# Patient Record
Sex: FEMALE | Race: BLACK OR AFRICAN AMERICAN | Employment: FULL TIME | URBAN - METROPOLITAN AREA
[De-identification: names, ages, dates, MRNs, and addresses within clinical notes are randomized per-mention and may not be internally consistent; named-entity substitution may affect disease eponyms.]

---

## 2018-07-07 ENCOUNTER — HOSPITAL ENCOUNTER (EMERGENCY)
Age: 28
Discharge: HOME | End: 2018-07-07
Attending: EMERGENCY MEDICINE
Payer: SUBSIDIZED

## 2018-07-07 VITALS
OXYGEN SATURATION: 98 % | DIASTOLIC BLOOD PRESSURE: 98 MMHG | RESPIRATION RATE: 17 BRPM | TEMPERATURE: 97.8 F | HEART RATE: 103 BPM | SYSTOLIC BLOOD PRESSURE: 151 MMHG

## 2018-07-07 DIAGNOSIS — R03.0 ELEVATED BLOOD PRESSURE READING: ICD-10-CM

## 2018-07-07 DIAGNOSIS — Z20.2 EXPOSURE TO CHLAMYDIA: ICD-10-CM

## 2018-07-07 DIAGNOSIS — A59.9 TRICHOMONAL INFECTION: Primary | ICD-10-CM

## 2018-07-07 LAB
APPEARANCE UR: ABNORMAL
BACTERIA URNS QL MICRO: ABNORMAL /HPF
BILIRUB UR QL: NEGATIVE
COLOR UR: YELLOW
EPITH CASTS URNS QL MICRO: ABNORMAL /LPF (ref 0–5)
GLUCOSE UR STRIP.AUTO-MCNC: NEGATIVE MG/DL
HCG UR QL: NEGATIVE
HGB UR QL STRIP: ABNORMAL
HYALINE CASTS URNS QL MICRO: ABNORMAL /LPF (ref 0–2)
KETONES UR QL STRIP.AUTO: NEGATIVE MG/DL
LEUKOCYTE ESTERASE UR QL STRIP.AUTO: ABNORMAL
MUCOUS THREADS URNS QL MICRO: ABNORMAL /LPF
NITRITE UR QL STRIP.AUTO: NEGATIVE
PH UR STRIP: 5.5 [PH] (ref 5–8)
PROT UR STRIP-MCNC: 30 MG/DL
RBC #/AREA URNS HPF: ABNORMAL /HPF (ref 0–5)
SERVICE CMNT-IMP: NORMAL
SP GR UR REFRACTOMETRY: 1.02 (ref 1–1.03)
UROBILINOGEN UR QL STRIP.AUTO: 1 EU/DL (ref 0.2–1)
WBC URNS QL MICRO: ABNORMAL /HPF (ref 0–5)
WET PREP GENITAL: NORMAL

## 2018-07-07 PROCEDURE — 96372 THER/PROPH/DIAG INJ SC/IM: CPT

## 2018-07-07 PROCEDURE — 74011250637 HC RX REV CODE- 250/637: Performed by: PHYSICIAN ASSISTANT

## 2018-07-07 PROCEDURE — 81001 URINALYSIS AUTO W/SCOPE: CPT | Performed by: PHYSICIAN ASSISTANT

## 2018-07-07 PROCEDURE — 87491 CHLMYD TRACH DNA AMP PROBE: CPT | Performed by: PHYSICIAN ASSISTANT

## 2018-07-07 PROCEDURE — 99284 EMERGENCY DEPT VISIT MOD MDM: CPT

## 2018-07-07 PROCEDURE — 81025 URINE PREGNANCY TEST: CPT | Performed by: PHYSICIAN ASSISTANT

## 2018-07-07 PROCEDURE — 74011250636 HC RX REV CODE- 250/636: Performed by: PHYSICIAN ASSISTANT

## 2018-07-07 PROCEDURE — 87210 SMEAR WET MOUNT SALINE/INK: CPT | Performed by: PHYSICIAN ASSISTANT

## 2018-07-07 RX ORDER — AZITHROMYCIN 1 G/1
1 POWDER, FOR SUSPENSION ORAL
Status: DISCONTINUED | OUTPATIENT
Start: 2018-07-07 | End: 2018-07-07

## 2018-07-07 RX ORDER — AZITHROMYCIN 250 MG/1
1000 TABLET, FILM COATED ORAL
Status: COMPLETED | OUTPATIENT
Start: 2018-07-07 | End: 2018-07-07

## 2018-07-07 RX ORDER — CEFTRIAXONE 250 MG/8ML
250 INJECTION, POWDER, FOR SOLUTION INTRAMUSCULAR; INTRAVENOUS
Status: COMPLETED | OUTPATIENT
Start: 2018-07-07 | End: 2018-07-07

## 2018-07-07 RX ADMIN — CEFTRIAXONE SODIUM 250 MG: 250 INJECTION, POWDER, FOR SOLUTION INTRAMUSCULAR; INTRAVENOUS at 11:55

## 2018-07-07 RX ADMIN — METRONIDAZOLE 2000 MG: 250 TABLET ORAL at 11:54

## 2018-07-07 RX ADMIN — AZITHROMYCIN 1000 MG: 250 TABLET, FILM COATED ORAL at 11:55

## 2018-07-07 NOTE — LETTER
7/10/2018 7:16 AM 
 
Ms. Ermelinda Guadalupe [address] [address] We have been unable to reach you by phone to notify you of your test results. Please call emergency room at [phone] and ask to speak with one of our providers in order to explain these results to you and advise you of any recommendations. Sincerely, Arlin Estes PA-C

## 2018-07-07 NOTE — DISCHARGE INSTRUCTIONS
Exposure to Sexually Transmitted Infections: Care Instructions  Your Care Instructions    Sexually transmitted infections (STIs) are those diseases spread by sexual contact. There are at least 20 different STIs, including chlamydia, gonorrhea, syphilis, and human immunodeficiency virus (HIV), which causes AIDS. Bacteria-caused STIs can be treated and cured. STIs caused by viruses, such as HIV, can be treated but not cured. Some STIs can reduce a woman's chances of getting pregnant in the future. STIs are spread during sexual contact, such as vaginal intercourse and oral or anal sex. Follow-up care is a key part of your treatment and safety. Be sure to make and go to all appointments, and call your doctor if you are having problems. It's also a good idea to know your test results and keep a list of the medicines you take. How can you care for yourself at home? · Your doctor may have given you a shot of antibiotics. If your doctor prescribed antibiotic pills, take them as directed. Do not stop taking them just because you feel better. You need to take the full course of antibiotics. · Do not have sexual contact while you have symptoms of an STI or are being treated for an STI. · Tell your sex partner (or partners) that he or she will need treatment. · If you are a woman, do not douche. Douching changes the normal balance of bacteria in the vagina and may spread an infection up into your reproductive organs. To prevent exposure to STIs in the future  · Use latex condoms every time you have sex. Use them from the beginning to the end of sexual contact. · Talk to your partner before you have sex. Find out if he or she has or is at risk for any STI. Keep in mind that a person may be able to spread an STI even if he or she does not have symptoms. · Do not have sex if you are being treated for an STI. · Do not have sex with anyone who has symptoms of an STI, such as sores on the genitals or mouth.   · Having one sex partner (who does not have STIs and does not have sex with anyone else) is a good way to avoid STIs. When should you call for help? Call your doctor now or seek immediate medical care if:  ? · You have new pain in your belly or pelvis. ? · You have symptoms of a urinary tract infection. These may include:  ¨ Pain or burning when you urinate. ¨ A frequent need to urinate without being able to pass much urine. ¨ Pain in the flank, which is just below the rib cage and above the waist on either side of the back. ¨ Blood in your urine. ¨ A fever. ? · You have new or worsening pain or swelling in the scrotum. ? Watch closely for changes in your health, and be sure to contact your doctor if:  ? · You have unusual vaginal bleeding. ? · You have a discharge from the vagina or penis. ? · You have any new symptoms, such as sores, bumps, rashes, blisters, or warts. ? · You have itching, tingling, pain, or burning in the genital or anal area. ? · You think you may have an STI. Where can you learn more? Go to http://leona-liss.info/. Enter M663 in the search box to learn more about \"Exposure to Sexually Transmitted Infections: Care Instructions. \"  Current as of: March 20, 2017  Content Version: 11.4  © 9037-1264 [employer]. Care instructions adapted under license by [employer] (which disclaims liability or warranty for this information). If you have questions about a medical condition or this instruction, always ask your healthcare professional. [place] [address] any warranty or liability for your use of this information. Trichomoniasis: Care Instructions  Your Care Instructions  Trichomoniasis is a sexually transmitted infection (STI) that is spread by having sex with an infected partner. Trichomoniasis is commonly called trich (say \"trick\"). In women, trich may cause vaginal itching and a smelly discharge.  But in many cases, especially in men, there are no symptoms. Mariela Emerson is treated so that you do not spread it to others. Both you and your sex partner or partners should be treated at the same time so you do not infect each other again. Trich may cause problems with pregnancy. Your doctor will talk with you about treatment for Trich if you are pregnant. Follow-up care is a key part of your treatment and safety. Be sure to make and go to all appointments, and call your doctor if you are having problems. It's also a good idea to know your test results and keep a list of the medicines you take. How can you care for yourself at home? · Take your antibiotics as directed. Do not stop taking them just because you feel better. You need to take the full course of antibiotics. · Do not have sex while you are being treated. If your doctor gave you a single dose of antibiotics, do not have sex for one week after being treated and until your partner also has been treated. · Tell your sex partner (or partners) that he or she will also need to be tested and treated. · Use a cold water compress or cool baths to relieve itching. To prevent trichomoniasis in the future  · Use latex condoms every time you have sex. Use them from the beginning to the end of sexual contact. · Talk to your partner before having sex. Find out if he or she has or is at risk for trich or any other STI. Keep in mind that a person may be able to spread an STI even if he or she does not have symptoms. · Do not have sex if you are being treated for trich or any other STI. · Do not have sex with anyone who has symptoms of an STI, such as sores on the genitals or mouth. · Having one sex partner (who does not have STIs and does not have sex with anyone else) is a good way to avoid STIs. When should you call for help? Call your doctor now or seek immediate medical care if:  ? · You have unusual vaginal bleeding. ? · You have a fever.    ? · You have new discharge from the vagina or penis. ? · You have pelvic pain. ? Watch closely for changes in your health, and be sure to contact your doctor if:  ? · You do not get better as expected. ? · You have any new symptoms or your symptoms get worse. Where can you learn more? Go to http://leona-liss.info/. Enter A290 in the search box to learn more about \"Trichomoniasis: Care Instructions. \"  Current as of: March 20, 2017  Content Version: 11.4  © 0395-2549 [employer]. Care instructions adapted under license by [employer] (which disclaims liability or warranty for this information). If you have questions about a medical condition or this instruction, always ask your healthcare professional. [address] any warranty or liability for your use of this information.

## 2018-07-07 NOTE — ED PROVIDER NOTES
EMERGENCY DEPARTMENT HISTORY AND PHYSICAL EXAM 
 
Date: 7/7/2018 Patient Name: Megha Herndon History of Presenting Illness Chief Complaint Patient presents with  Vaginal Discharge  
  std exposure History Provided By: Patient Chief Complaint: possible STI Duration: 1 Months Timing:  N/A Location: N/A Quality: N/A Severity: N/A Modifying Factors: unprotected sex Associated Symptoms: denies any other associated signs or symptoms Additional History (Context): Megha Herndon is a 33 y.o. female with recent UTI who presents with concern for having an STI x 1 day. Pt states she got a phone call from a partner she had been seeing who stated he was positive for Chlamydia. Pt states she is concerned and came in to be checked out. PT denied fever, chills, nausea, vomiting, diarrhea, constipation, abdominal pain, vaginal discharge. No other complaints. PCP: Not On File [facility] 
 
Current Facility-Administered Medications Medication Dose Route Frequency Provider Last Rate Last Dose  cefTRIAXone (ROCEPHIN) injection 250 mg  250 mg IntraMUSCular DYLON Cardona PA-C      
 azithromycin [facility]) powder 1 g  1 g Oral DYLON Cardona PA-C      
 metroNIDAZOLE (FLAGYL) 50 mg/mL oral suspension compound 2,000 mg  2,000 mg Oral DYLON Cardona PA-C Current Outpatient Prescriptions Medication Sig Dispense Refill  clotrimazole (LOTRIMIN) 1 % topical cream Apply  to affected area two (2) times a day. Apply to affected area 1 Tube 0  
 hydrOXYzine (ATARAX) 25 mg tablet Take 1-2 tablets by mouth every 6 hours as needed. 30 tablet 0 Past History Past Medical History: 
Past Medical History:  
Diagnosis Date  Hypertension  UTI (lower urinary tract infection) Past Surgical History: No past surgical history on file. Family History: No family history on file. Social History: 
Social History Substance Use Topics  Smoking status: Former Smoker  Smokeless tobacco: Not on file  Alcohol use Yes Comment: 2 wine coollers a week Allergies: 
No Known Allergies Review of Systems Review of Systems Constitutional: Negative for chills and fever. HENT: Negative for congestion and sore throat. Respiratory: Negative for cough, shortness of breath and wheezing. Cardiovascular: Negative for chest pain and palpitations. Gastrointestinal: Negative for abdominal pain, constipation, diarrhea, nausea and vomiting. Genitourinary: Negative for dysuria and urgency. Musculoskeletal: Negative for back pain. Skin: Negative for rash. Neurological: Negative for weakness. All other systems reviewed and are negative. All Other Systems Negative Physical Exam  
 
Vitals:  
 07/07/18 0944 07/07/18 1034 BP: (!) 149/103 (!) 151/98 Pulse: (!) 110 (!) 103 Resp: 17 Temp: 97.8 °F (36.6 °C) SpO2: 98% Physical Exam  
Constitutional: She is oriented to person, place, and time. She appears well-developed and well-nourished. No distress. HENT:  
Head: Normocephalic and atraumatic. Eyes: Conjunctivae are normal. Right eye exhibits no discharge. Left eye exhibits no discharge. Neck: Normal range of motion. Neck supple. Cardiovascular: Normal rate, regular rhythm and intact distal pulses. Exam reveals no gallop and no friction rub. No murmur heard. Pulmonary/Chest: Effort normal and breath sounds normal. No respiratory distress. She has no wheezes. She has no rales. Abdominal: Soft. Bowel sounds are normal. There is no tenderness. There is no rebound and no guarding. Neurological: She is alert and oriented to person, place, and time. Skin: Skin is warm and dry. No rash noted. She is not diaphoretic. Diagnostic Study Results Labs - Recent Results (from the past 12 hour(s)) URINALYSIS W/ RFLX MICROSCOPIC Collection Time: 07/07/18  9:46 AM  
Result Value Ref Range Color YELLOW Appearance CLOUDY Specific gravity 1.024 1.005 - 1.030    
 pH (UA) 5.5 5.0 - 8.0 Protein 30 (A) NEG mg/dL Glucose NEGATIVE  NEG mg/dL Ketone NEGATIVE  NEG mg/dL Bilirubin NEGATIVE  NEG Blood SMALL (A) NEG Urobilinogen 1.0 0.2 - 1.0 EU/dL Nitrites NEGATIVE  NEG Leukocyte Esterase SMALL (A) NEG    
HCG URINE, QL Collection Time: 07/07/18  9:46 AM  
Result Value Ref Range HCG urine, QL NEGATIVE  NEG    
URINE MICROSCOPIC ONLY Collection Time: 07/07/18  9:46 AM  
Result Value Ref Range WBC 10 to 15 0 - 5 /hpf  
 RBC 2 to 4 0 - 5 /hpf Epithelial cells 4+ 0 - 5 /lpf Bacteria 2+ (A) NEG /hpf Mucus 2+ (A) NEG /lpf Hyaline cast 2 to 4 0 - 2 /lpf WET PREP Collection Time: 07/07/18 10:25 AM  
Result Value Ref Range Special Requests: NO SPECIAL REQUESTS Wet prep FEW MOTILE TRICHOMONAS NOTED Wet prep FEW 
CLUE CELLS PRESENT Wet prep NO YEAST SEEN Radiologic Studies - No orders to display CT Results  (Last 48 hours) None CXR Results  (Last 48 hours) None Medical Decision Making I am the first provider for this patient. I reviewed the vital signs, available nursing notes, past medical history, past surgical history, family history and social history. Records Reviewed: Nursing Notes and Old Medical Records Procedures: 
Pelvic Exam 
Date/Time: 7/7/2018 10:26 AM 
Performed by: student Procedure duration:  5 minutes. Documented by:  student. Exam assisted by:  DEXTER Perdomo. Type of exam performed: bimanual and speculum. External genitalia appearance: normal.   
Vaginal exam:  normal and discharge. Cervical exam:  normal, no cervical motion tenderness and os closed. Specimen(s) collected:  chlamydia, GC and vaginal culture. Bimanual exam:  normal.   
Patient tolerance: Patient tolerated the procedure well with no immediate complications Provider Notes (Medical Decision Making): DDX: STI 
 
11:20 AM Pt well appearing and in NAD. +Trich. Will treat for gc/chlam as well. PCP f/u for further eval.  
 
MED RECONCILIATION: 
Current Facility-Administered Medications Medication Dose Route Frequency  cefTRIAXone (ROCEPHIN) injection 250 mg  250 mg IntraMUSCular NOW  azithromycin (ZITHROMAX) powder 1 g  1 g Oral NOW  metroNIDAZOLE (FLAGYL) 50 mg/mL oral suspension compound 2,000 mg  2,000 mg Oral NOW Current Outpatient Prescriptions Medication Sig  clotrimazole (LOTRIMIN) 1 % topical cream Apply  to affected area two (2) times a day. Apply to affected area  hydrOXYzine (ATARAX) 25 mg tablet Take 1-2 tablets by mouth every 6 hours as needed. Disposition: 
discharge DISCHARGE NOTE:  
 
Patient is improved, resting quietly and comfortably. The patient will be discharged home.  
  
The patient was reassured that these symptoms do not appear to represent a serious or life threatening condition at this time. Warning signs of worsening condition were discussed and understood by the patient.  
  
Based on patient's age, coexisting illness, exam, and the results of this ED evaluation, the decision to treat as an outpatient was made. Based on the information available at time of discharge, acute pathology requiring immediate intervention was deemed relative unlikely. While it is impossible to completely exclude the possibility of underlying serious disease or worsening of condition, I feel the relative likelihood is extremely low. I discussed this uncertainty with the patient, who understood ED evaluation and treatment and felt comfortable with the outpatient treatment plan.  
  
All questions regarding care, test results, and follow up were answered. The patient is stable and appropriate to discharge. They understand that they should return to the emergency department for any new or worsening symptoms.  I stressed the importance of follow up for repeat assessment and possibly further evaluation/treatment. Follow-up Information Follow up With Details Comments Contact Info [facility] Call To make a follow up appointment [address] [facility] [address] [phone] [facility] EMERGENCY DEPT  Immediately if symptoms worsen [address] Current Discharge Medication List  
  
 
 
 
Diagnosis Clinical Impression: 1. Trichomonal infection 2. Exposure to chlamydia 3. Elevated blood pressure reading

## 2018-07-07 NOTE — ED NOTES
I have reviewed discharge instructions with the patient. The patient verbalized understanding. Patient armband removed and shredded Pt left ED ambulatory, alert and in NAD.

## 2018-07-09 LAB
C TRACH RRNA SPEC QL NAA+PROBE: POSITIVE
N GONORRHOEA RRNA SPEC QL NAA+PROBE: NEGATIVE
SPECIMEN SOURCE: ABNORMAL

## 2018-11-25 ENCOUNTER — APPOINTMENT (OUTPATIENT)
Dept: CT IMAGING | Age: 28
End: 2018-11-25
Attending: PHYSICIAN ASSISTANT
Payer: SUBSIDIZED

## 2018-11-25 ENCOUNTER — HOSPITAL ENCOUNTER (EMERGENCY)
Age: 28
Discharge: HOME | End: 2018-11-26
Attending: EMERGENCY MEDICINE
Payer: SUBSIDIZED

## 2018-11-25 VITALS
OXYGEN SATURATION: 99 % | BODY MASS INDEX: 37.25 KG/M2 | TEMPERATURE: 98 F | HEART RATE: 98 BPM | DIASTOLIC BLOOD PRESSURE: 103 MMHG | RESPIRATION RATE: 18 BRPM | SYSTOLIC BLOOD PRESSURE: 152 MMHG | WEIGHT: 245 LBS

## 2018-11-25 DIAGNOSIS — R51.9 ACUTE NONINTRACTABLE HEADACHE, UNSPECIFIED HEADACHE TYPE: Primary | ICD-10-CM

## 2018-11-25 DIAGNOSIS — R03.0 ELEVATED BLOOD PRESSURE READING: ICD-10-CM

## 2018-11-25 LAB
ALBUMIN SERPL-MCNC: 3.8 G/DL (ref 3.4–5)
ALBUMIN/GLOB SERPL: 0.8 {RATIO} (ref 0.8–1.7)
ALP SERPL-CCNC: 126 U/L (ref 45–117)
ALT SERPL-CCNC: 25 U/L (ref 13–56)
ANION GAP SERPL CALC-SCNC: 7 MMOL/L (ref 3–18)
APPEARANCE UR: CLEAR
AST SERPL-CCNC: 20 U/L (ref 15–37)
BACTERIA URNS QL MICRO: ABNORMAL /HPF
BASOPHILS # BLD: 0 K/UL (ref 0–0.1)
BASOPHILS NFR BLD: 0 % (ref 0–2)
BILIRUB SERPL-MCNC: 0.3 MG/DL (ref 0.2–1)
BILIRUB UR QL: NEGATIVE
BUN SERPL-MCNC: 15 MG/DL (ref 7–18)
BUN/CREAT SERPL: 15 (ref 12–20)
CALCIUM SERPL-MCNC: 9.2 MG/DL (ref 8.5–10.1)
CHLORIDE SERPL-SCNC: 108 MMOL/L (ref 100–108)
CO2 SERPL-SCNC: 27 MMOL/L (ref 21–32)
COLOR UR: YELLOW
CREAT SERPL-MCNC: 1.03 MG/DL (ref 0.6–1.3)
DIFFERENTIAL METHOD BLD: ABNORMAL
EOSINOPHIL # BLD: 0.1 K/UL (ref 0–0.4)
EOSINOPHIL NFR BLD: 1 % (ref 0–5)
EPITH CASTS URNS QL MICRO: ABNORMAL /LPF (ref 0–5)
ERYTHROCYTE [DISTWIDTH] IN BLOOD BY AUTOMATED COUNT: 15.1 % (ref 11.6–14.5)
GLOBULIN SER CALC-MCNC: 4.9 G/DL (ref 2–4)
GLUCOSE SERPL-MCNC: 95 MG/DL (ref 74–99)
GLUCOSE UR STRIP.AUTO-MCNC: NEGATIVE MG/DL
HCG SERPL QL: NEGATIVE
HCT VFR BLD AUTO: 38.7 % (ref 35–45)
HGB BLD-MCNC: 12.5 G/DL (ref 12–16)
HGB UR QL STRIP: NEGATIVE
KETONES UR QL STRIP.AUTO: NEGATIVE MG/DL
LEUKOCYTE ESTERASE UR QL STRIP.AUTO: ABNORMAL
LYMPHOCYTES # BLD: 4 K/UL (ref 0.9–3.6)
LYMPHOCYTES NFR BLD: 42 % (ref 21–52)
MCH RBC QN AUTO: 26.3 PG (ref 24–34)
MCHC RBC AUTO-ENTMCNC: 32.3 G/DL (ref 31–37)
MCV RBC AUTO: 81.5 FL (ref 74–97)
MONOCYTES # BLD: 0.6 K/UL (ref 0.05–1.2)
MONOCYTES NFR BLD: 6 % (ref 3–10)
NEUTS SEG # BLD: 4.9 K/UL (ref 1.8–8)
NEUTS SEG NFR BLD: 51 % (ref 40–73)
NITRITE UR QL STRIP.AUTO: NEGATIVE
PH UR STRIP: 8 [PH] (ref 5–8)
PLATELET # BLD AUTO: 399 K/UL (ref 135–420)
PMV BLD AUTO: 9.9 FL (ref 9.2–11.8)
POTASSIUM SERPL-SCNC: 4.1 MMOL/L (ref 3.5–5.5)
PROT SERPL-MCNC: 8.7 G/DL (ref 6.4–8.2)
PROT UR STRIP-MCNC: NEGATIVE MG/DL
RBC # BLD AUTO: 4.75 M/UL (ref 4.2–5.3)
RBC #/AREA URNS HPF: NEGATIVE /HPF (ref 0–5)
SODIUM SERPL-SCNC: 142 MMOL/L (ref 136–145)
SP GR UR REFRACTOMETRY: 1.01 (ref 1–1.03)
UROBILINOGEN UR QL STRIP.AUTO: 1 EU/DL (ref 0.2–1)
WBC # BLD AUTO: 9.6 K/UL (ref 4.6–13.2)
WBC URNS QL MICRO: ABNORMAL /HPF (ref 0–4)

## 2018-11-25 PROCEDURE — 74011250636 HC RX REV CODE- 250/636: Performed by: PHYSICIAN ASSISTANT

## 2018-11-25 PROCEDURE — 84703 CHORIONIC GONADOTROPIN ASSAY: CPT

## 2018-11-25 PROCEDURE — 85025 COMPLETE CBC W/AUTO DIFF WBC: CPT

## 2018-11-25 PROCEDURE — 96361 HYDRATE IV INFUSION ADD-ON: CPT

## 2018-11-25 PROCEDURE — 70450 CT HEAD/BRAIN W/O DYE: CPT

## 2018-11-25 PROCEDURE — 96375 TX/PRO/DX INJ NEW DRUG ADDON: CPT

## 2018-11-25 PROCEDURE — 96374 THER/PROPH/DIAG INJ IV PUSH: CPT

## 2018-11-25 PROCEDURE — 81001 URINALYSIS AUTO W/SCOPE: CPT

## 2018-11-25 PROCEDURE — 99283 EMERGENCY DEPT VISIT LOW MDM: CPT

## 2018-11-25 PROCEDURE — 80053 COMPREHEN METABOLIC PANEL: CPT

## 2018-11-25 RX ORDER — METOCLOPRAMIDE HYDROCHLORIDE 5 MG/ML
5 INJECTION INTRAMUSCULAR; INTRAVENOUS
Status: DISCONTINUED | OUTPATIENT
Start: 2018-11-25 | End: 2018-11-25

## 2018-11-25 RX ORDER — METOCLOPRAMIDE HYDROCHLORIDE 5 MG/ML
5 INJECTION INTRAMUSCULAR; INTRAVENOUS EVERY 6 HOURS
Status: DISCONTINUED | OUTPATIENT
Start: 2018-11-25 | End: 2018-11-26 | Stop reason: HOSPADM

## 2018-11-25 RX ORDER — METOCLOPRAMIDE HYDROCHLORIDE 5 MG/ML
5 INJECTION INTRAMUSCULAR; INTRAVENOUS EVERY 6 HOURS
Status: DISCONTINUED | OUTPATIENT
Start: 2018-11-26 | End: 2018-11-25

## 2018-11-25 RX ORDER — ONDANSETRON 4 MG/1
4 TABLET, ORALLY DISINTEGRATING ORAL
Status: DISCONTINUED | OUTPATIENT
Start: 2018-11-25 | End: 2018-11-25

## 2018-11-25 RX ORDER — DIPHENHYDRAMINE HYDROCHLORIDE 50 MG/ML
25 INJECTION, SOLUTION INTRAMUSCULAR; INTRAVENOUS
Status: COMPLETED | OUTPATIENT
Start: 2018-11-25 | End: 2018-11-25

## 2018-11-25 RX ORDER — BUTALBITAL, ACETAMINOPHEN AND CAFFEINE 300; 40; 50 MG/1; MG/1; MG/1
1 CAPSULE ORAL
Qty: 15 CAP | Refills: 0 | Status: SHIPPED | OUTPATIENT
Start: 2018-11-25

## 2018-11-25 RX ORDER — ACETAMINOPHEN 325 MG/1
650 TABLET ORAL
Status: DISCONTINUED | OUTPATIENT
Start: 2018-11-25 | End: 2018-11-25

## 2018-11-25 RX ADMIN — METOCLOPRAMIDE 5 MG: 5 INJECTION, SOLUTION INTRAMUSCULAR; INTRAVENOUS at 22:27

## 2018-11-25 RX ADMIN — DIPHENHYDRAMINE HYDROCHLORIDE 25 MG: 50 INJECTION, SOLUTION INTRAMUSCULAR; INTRAVENOUS at 22:26

## 2018-11-25 RX ADMIN — SODIUM CHLORIDE 1000 ML: 900 INJECTION, SOLUTION INTRAVENOUS at 22:29

## 2018-11-25 NOTE — LETTER
[facility] 
[facility] EMERGENCY DEPT 
[address] [facility] [address] 
[phone] Work/School Note Date: 11/25/2018 To Whom It May concern: 
 
Miguelina Dhillon was seen and treated today in the emergency room by the following provider(s): 
Attending Provider: Malena Brambila MD 
Physician Assistant: NANI Mercado. Miguelina Dhillon may return to work 11/27/18. Sincerely, Lorenza Alonsoma

## 2018-11-26 NOTE — DISCHARGE INSTRUCTIONS
Headache: Care Instructions  Your Care Instructions    Headaches have many possible causes. Most headaches aren't a sign of a more serious problem, and they will get better on their own. Home treatment may help you feel better faster. The doctor has checked you carefully, but problems can develop later. If you notice any problems or new symptoms, get medical treatment right away. Follow-up care is a key part of your treatment and safety. Be sure to make and go to all appointments, and call your doctor if you are having problems. It's also a good idea to know your test results and keep a list of the medicines you take. How can you care for yourself at home? · Do not drive if you have taken a prescription pain medicine. · Rest in a quiet, dark room until your headache is gone. Close your eyes and try to relax or go to sleep. Don't watch TV or read. · Put a cold, moist cloth or cold pack on the painful area for 10 to 20 minutes at a time. Put a thin cloth between the cold pack and your skin. · Use a warm, moist towel or a heating pad set on low to relax tight shoulder and neck muscles. · Have someone gently massage your neck and shoulders. · Take pain medicines exactly as directed. ? If the doctor gave you a prescription medicine for pain, take it as prescribed. ? If you are not taking a prescription pain medicine, ask your doctor if you can take an over-the-counter medicine. · Be careful not to take pain medicine more often than the instructions allow, because you may get worse or more frequent headaches when the medicine wears off. · Do not ignore new symptoms that occur with a headache, such as a fever, weakness or numbness, vision changes, or confusion. These may be signs of a more serious problem. To prevent headaches  · Keep a headache diary so you can figure out what triggers your headaches. Avoiding triggers may help you prevent headaches.  Record when each headache began, how long it lasted, and what the pain was like (throbbing, aching, stabbing, or dull). Write down any other symptoms you had with the headache, such as nausea, flashing lights or dark spots, or sensitivity to bright light or loud noise. Note if the headache occurred near your period. List anything that might have triggered the headache, such as certain foods (chocolate, cheese, wine) or odors, smoke, bright light, stress, or lack of sleep. · Find healthy ways to deal with stress. Headaches are most common during or right after stressful times. Take time to relax before and after you do something that has caused a headache in the past.  · Try to keep your muscles relaxed by keeping good posture. Check your jaw, face, neck, and shoulder muscles for tension, and try relaxing them. When sitting at a desk, change positions often, and stretch for 30 seconds each hour. · Get plenty of sleep and exercise. · Eat regularly and well. Long periods without food can trigger a headache. · Treat yourself to a massage. Some people find that regular massages are very helpful in relieving tension. · Limit caffeine by not drinking too much coffee, tea, or soda. But don't quit caffeine suddenly, because that can also give you headaches. · Reduce eyestrain from computers by blinking frequently and looking away from the computer screen every so often. Make sure you have proper eyewear and that your monitor is set up properly, about an arm's length away. · Seek help if you have depression or anxiety. Your headaches may be linked to these conditions. Treatment can both prevent headaches and help with symptoms of anxiety or depression. When should you call for help? Call 911 anytime you think you may need emergency care. For example, call if:    · You have signs of a stroke. These may include:  ? Sudden numbness, paralysis, or weakness in your face, arm, or leg, especially on only one side of your body. ? Sudden vision changes.   ? Sudden trouble speaking. ? Sudden confusion or trouble understanding simple statements. ? Sudden problems with walking or balance. ? A sudden, severe headache that is different from past headaches.    Call your doctor now or seek immediate medical care if:    · You have a new or worse headache.     · Your headache gets much worse. Where can you learn more? Go to http://leona-liss.info/. Enter M271 in the search box to learn more about \"Headache: Care Instructions. \"  Current as of: June 4, 2018  Content Version: 11.8  © 4596-2687 [employer]. Care instructions adapted under license by [employer] (which disclaims liability or warranty for this information). If you have questions about a medical condition or this instruction, always ask your healthcare professional. [address] any warranty or liability for your use of this information. Elevated Blood Pressure: Care Instructions  Your Care Instructions    Blood pressure is a measure of how hard the blood pushes against the walls of your arteries. It's normal for blood pressure to go up and down throughout the day. But if it stays up over time, you have high blood pressure. Two numbers tell you your blood pressure. The first number is the systolic pressure. It shows how hard the blood pushes when your heart is pumping. The second number is the diastolic pressure. It shows how hard the blood pushes between heartbeats, when your heart is relaxed and filling with blood. An ideal blood pressure in adults is less than 120/80 (say \"120 over 80\"). High blood pressure is 140/90 or higher. You have high blood pressure if your top number is 140 or higher or your bottom number is 90 or higher, or both. The main test for high blood pressure is simple, fast, and painless. To diagnose high blood pressure, your doctor will test your blood pressure at different times.  After testing your blood pressure, your doctor may ask you to test it again when you are home. If you are diagnosed with high blood pressure, you can work with your doctor to make a long-term plan to manage it. Follow-up care is a key part of your treatment and safety. Be sure to make and go to all appointments, and call your doctor if you are having problems. It's also a good idea to know your test results and keep a list of the medicines you take. How can you care for yourself at home? · Do not smoke. Smoking increases your risk for heart attack and stroke. If you need help quitting, talk to your doctor about stop-smoking programs and medicines. These can increase your chances of quitting for good. · Stay at a healthy weight. · Try to limit how much sodium you eat to less than 2,300 milligrams (mg) a day. Your doctor may ask you to try to eat less than 1,500 mg a day. · Be physically active. Get at least 30 minutes of exercise on most days of the week. Walking is a good choice. You also may want to do other activities, such as running, swimming, cycling, or playing tennis or team sports. · Avoid or limit alcohol. Talk to your doctor about whether you can drink any alcohol. · Eat plenty of fruits, vegetables, and low-fat dairy products. Eat less saturated and total fats. · Learn how to check your blood pressure at home. When should you call for help? Call your doctor now or seek immediate medical care if:  ? · Your blood pressure is much higher than normal (such as 180/110 or higher). ? · You think high blood pressure is causing symptoms such as:  ¨ Severe headache. ¨ Blurry vision. ? Watch closely for changes in your health, and be sure to contact your doctor if:  ? · You do not get better as expected. Where can you learn more? Go to http://leona-liss.info/. Enter O999 in the search box to learn more about \"Elevated Blood Pressure: Care Instructions. \"  Current as of: September 21, 2016  Content Version: 11.4  © 1388-9632 [employer]. Care instructions adapted under license by [employer] (which disclaims liability or warranty for this information). If you have questions about a medical condition or this instruction, always ask your healthcare professional. [place] [address] any warranty or liability for your use of this information.

## 2018-11-26 NOTE — ED PROVIDER NOTES
EMERGENCY DEPARTMENT HISTORY AND PHYSICAL EXAM 
 
Date: 11/25/2018 Patient Name: Booker Mccarty History of Presenting Illness Chief Complaint Patient presents with  Hypertension  Headache History Provided By: Patient Chief Complaint: headache Duration:for over 3 months, worse in the past three days Timing:  constant, worsening over the past three days Location: frontal 
 
Additional History (Context): Booker Mccarty is a 33 y.o. female with obesity who presents with C/O frontal headache that has been ongoing for the past three months and elevated blood pressure. Patient states that the headache is frontal in nature and that she has associated photophobia. Denies fevers, chills, neck pain, vision changes, NV. States that three days ago, she took Motrin for her headache, but denies taking any more medicines. States at home, she had a friend check her BP which was 683 systolic. She does not have a history of elevated BP. She denies history of migraines, recent head trauma, or any other complaints. PCP: None Current Facility-Administered Medications Medication Dose Route Frequency Provider Last Rate Last Dose  metoclopramide HCl (REGLAN) injection 5 mg  5 mg IntraVENous Q6H [place]ine [place], [place]   5 mg at 11/25/18 2227 Current Outpatient Medications Medication Sig Dispense Refill  butalbital-acetaminophen-caff (FIORICET) -40 mg per capsule Take 1 Cap by mouth every four (4) hours as needed for Headache. 15 Cap 0  clotrimazole (LOTRIMIN) 1 % topical cream Apply  to affected area two (2) times a day. Apply to affected area 1 Tube 0 Past History Past Medical History: 
Past Medical History:  
Diagnosis Date  Hypertension  UTI (lower urinary tract infection) Past Surgical History: 
History reviewed. No pertinent surgical history. Family History: 
History reviewed. No pertinent family history. Social History: Social History Tobacco Use  Smoking status: Former Smoker Substance Use Topics  Alcohol use: Yes Comment: 2 wine coollers a week  Drug use: No  
 
 
Allergies: Allergies Allergen Reactions  Mushroom Shortness of Breath Review of Systems Review of Systems Constitutional: Negative for chills and fever. Eyes: Positive for visual disturbance. Respiratory: Negative for chest tightness, shortness of breath and wheezing. Cardiovascular: Negative for chest pain and palpitations. Gastrointestinal: Negative for abdominal pain, constipation, diarrhea, nausea and vomiting. Genitourinary: Negative for dysuria, flank pain and frequency. Musculoskeletal: Negative for neck pain and neck stiffness. Skin: Negative. Neurological: Positive for headaches. Negative for dizziness, syncope and weakness. All other systems reviewed and are negative. Physical Exam  
 
Vitals:  
 11/25/18 2020 11/25/18 2032 11/25/18 2309 BP: (!) 205/136  (!) 152/103 Pulse: (!) 111  98 Resp: 16 16 18 Temp: 98.7 °F (37.1 °C)  98 °F (36.7 °C) SpO2: 100%  99% Weight: 111.1 kg (245 lb) Physical Exam  
Constitutional: She appears well-developed and well-nourished. No distress. HENT:  
Head: Normocephalic and atraumatic. Eyes: EOM are normal. Pupils are equal, round, and reactive to light.  
+ photophobia Neck: Neck supple. No nucha rigidity Cardiovascular: Normal rate and regular rhythm. Exam reveals no gallop and no friction rub. No murmur heard. Pulmonary/Chest: Effort normal and breath sounds normal. No respiratory distress. She has no wheezes. She has no rales. Abdominal: Soft. Bowel sounds are normal. She exhibits no distension and no mass. There is no tenderness. There is no rebound and no guarding. Musculoskeletal: Normal range of motion. She exhibits no tenderness or deformity. Lymphadenopathy:  
  She has no cervical adenopathy. Neurological: CN 2-12 intact, no pronator drift, no leg drift, normal finger to nose bilaterally, ambulates without difficulty. No dysarthria, no nystagmus. Alert and oriented times 4. Skin: Skin is warm and dry. No rash noted. She is not diaphoretic. Psychiatric: She has a normal mood and affect. Her behavior is normal.  
Nursing note and vitals reviewed. Diagnostic Study Results Labs - Recent Results (from the past 12 hour(s)) CBC WITH AUTOMATED DIFF Collection Time: 11/25/18  9:15 PM  
Result Value Ref Range WBC 9.6 4.6 - 13.2 K/uL  
 RBC 4.75 4.20 - 5.30 M/uL  
 HGB 12.5 12.0 - 16.0 g/dL HCT 38.7 35.0 - 45.0 % MCV 81.5 74.0 - 97.0 FL  
 MCH 26.3 24.0 - 34.0 PG  
 MCHC 32.3 31.0 - 37.0 g/dL  
 RDW 15.1 (H) 11.6 - 14.5 % PLATELET 904 670 - 291 K/uL MPV 9.9 9.2 - 11.8 FL  
 NEUTROPHILS 51 40 - 73 % LYMPHOCYTES 42 21 - 52 % MONOCYTES 6 3 - 10 % EOSINOPHILS 1 0 - 5 % BASOPHILS 0 0 - 2 %  
 ABS. NEUTROPHILS 4.9 1.8 - 8.0 K/UL  
 ABS. LYMPHOCYTES 4.0 (H) 0.9 - 3.6 K/UL  
 ABS. MONOCYTES 0.6 0.05 - 1.2 K/UL  
 ABS. EOSINOPHILS 0.1 0.0 - 0.4 K/UL  
 ABS. BASOPHILS 0.0 0.0 - 0.1 K/UL  
 DF AUTOMATED METABOLIC PANEL, COMPREHENSIVE Collection Time: 11/25/18  9:15 PM  
Result Value Ref Range Sodium 142 136 - 145 mmol/L Potassium 4.1 3.5 - 5.5 mmol/L Chloride 108 100 - 108 mmol/L  
 CO2 27 21 - 32 mmol/L Anion gap 7 3.0 - 18 mmol/L Glucose 95 74 - 99 mg/dL BUN 15 7.0 - 18 MG/DL Creatinine 1.03 0.6 - 1.3 MG/DL  
 BUN/Creatinine ratio 15 12 - 20 GFR est AA >60 >60 ml/min/1.73m2 GFR est non-AA >60 >60 ml/min/1.73m2 Calcium 9.2 8.5 - 10.1 MG/DL Bilirubin, total 0.3 0.2 - 1.0 MG/DL  
 ALT (SGPT) 25 13 - 56 U/L  
 AST (SGOT) 20 15 - 37 U/L Alk. phosphatase 126 (H) 45 - 117 U/L Protein, total 8.7 (H) 6.4 - 8.2 g/dL Albumin 3.8 3.4 - 5.0 g/dL Globulin 4.9 (H) 2.0 - 4.0 g/dL A-G Ratio 0.8 0.8 - 1.7 HCG QL SERUM  
 Collection Time: 11/25/18  9:15 PM  
Result Value Ref Range HCG, Ql. NEGATIVE  NEG    
URINALYSIS W/ RFLX MICROSCOPIC Collection Time: 11/25/18  9:15 PM  
Result Value Ref Range Color YELLOW Appearance CLEAR Specific gravity 1.015 1.005 - 1.030    
 pH (UA) 8.0 5.0 - 8.0 Protein NEGATIVE  NEG mg/dL Glucose NEGATIVE  NEG mg/dL Ketone NEGATIVE  NEG mg/dL Bilirubin NEGATIVE  NEG Blood NEGATIVE  NEG Urobilinogen 1.0 0.2 - 1.0 EU/dL Nitrites NEGATIVE  NEG Leukocyte Esterase SMALL (A) NEG URINE MICROSCOPIC ONLY Collection Time: 11/25/18  9:15 PM  
Result Value Ref Range WBC 3 to 5 0 - 4 /hpf  
 RBC NEGATIVE  0 - 5 /hpf Epithelial cells 1+ 0 - 5 /lpf Bacteria FEW (A) NEG /hpf Radiologic Studies -  
CT HEAD WO CONT Final Result CT Results  (Last 48 hours)  
          
 11/25/18 2101  CT HEAD WO CONT Final result Impression:  IMPRESSION:  
1. No acute intracranial process identified. Narrative:  EXAM: CT of the Head without contrast  
   
INDICATION: Headache and hypertension TECHNIQUE: CT of the head from the vertex to the skull base performed. No IV  
contrast administered. All CT scans at this facility are performed using dose optimization technique as  
appropriate to a performed exam, to include automated exposure control,  
adjustment of the mA and/or kV according to patient size (including appropriate  
matching for site specific examination) or use of iterative reconstruction  
technique. COMPARISON: None. FINDINGS: No evidence of acute intra-axial or extra-axial hemorrhage. The  
ventricles and sulci are symmetric. No midline shift, mass effect or mass  
lesion appreciated. The gray-white junction is preserved. No evidence of an  
acute infarct identified. The mastoid air cells are well aerated. The paranasal  
sinuses are unremarkable. The orbits are normal. The scalp and skull are unremarkable. CXR Results  (Last 48 hours) None Medical Decision Making I am the first provider for this patient. I reviewed the vital signs, available nursing notes, past medical history, past surgical history, family history and social history. Vital Signs-Reviewed the patient's vital signs. Records Reviewed: Nursing Notes and Old Medical Records Procedures: 
Procedures Provider Notes (Medical Decision Making):  
Progress:  Rounded on patient about head CT which is reassuring. Initially she told RN that she did not want to have IV meds, but when I rounded on her, she did. Will await labs, give medicines for headache, monitor BP. Collette Lederer, [place] Progress:  Patient's headache is completely resolved after 25 mg of Benadryl and 5 mg of Reglan. Significant improvement of elevated BP since headache has been controlled. Labs are reassuring. Patient would like to be discharged. Plan to send home with PCP and neuro follow up, as well as Fioricet. Collette Lederer, [place] Impression; Headache, elevated BP. Patient much better. Plan to send home with treatment plan as outlined above. Doubt infectious etiology and low suspicion for [facility]. Patient's pain has completely resolved with benadryl and Reglan. Collette Lederer, [place] 
 
 
MED RECONCILIATION: 
Current Facility-Administered Medications Medication Dose Route Frequency  metoclopramide HCl (REGLAN) injection 5 mg  5 mg IntraVENous Q6H Current Outpatient Medications Medication Sig  butalbital-acetaminophen-caff (FIORICET) -40 mg per capsule Take 1 Cap by mouth every four (4) hours as needed for Headache.  clotrimazole (LOTRIMIN) 1 % topical cream Apply  to affected area two (2) times a day. Apply to affected area Disposition: 
discharged DISCHARGE NOTE:  
 
Pt has been reexamined.  Patient has no new complaints, changes, or physical findings. Care plan outlined and precautions discussed. Results were reviewed with the patient. All medications were reviewed with the patient; will d/c home with fioricet. All of pt's questions and concerns were addressed. Patient was instructed and agrees to follow up with PCP and neuro, as well as to return to the ED upon further deterioration. Patient is ready to go home. Follow-up Information Follow up With Specialties Details Why Contact Info [facility] EMERGENCY DEPT Emergency Medicine  If symptoms worsen [address] [id number] 
[phone] [facility]  In 3 days for follow up for blood pressure and headaches [address] Jojo Cary MD Neurology In 1 week for follow up for headaches [address] [facility] [address] 
[address] [address] [facility] [address] [phone] Current Discharge Medication List  
  
START taking these medications Details  
butalbital-acetaminophen-caff (FIORICET) -40 mg per capsule Take 1 Cap by mouth every four (4) hours as needed for Headache. Qty: 15 Cap, Refills: 0 CONTINUE these medications which have NOT CHANGED Details  
clotrimazole (LOTRIMIN) 1 % topical cream Apply  to affected area two (2) times a day. Apply to affected area Qty: 1 Tube, Refills: 0 STOP taking these medications  
  
 hydrOXYzine (ATARAX) 25 mg tablet Comments:  
Reason for Stopping:   
   
  
 
 
 
 
 
Diagnosis Clinical Impression: 1. Acute nonintractable headache, unspecified headache type 2. Elevated blood pressure reading

## 2018-11-26 NOTE — ED NOTES
LC received referral to assist pt. I called pt. At 10:50am and discussed  Medicaid expansion enrollment and lorri care for insurance options. We discussed [facility] and [facility] as options to establish care with a PCP. Pt. informed to bring ID, 2 paystubs, and proof of address when she comes to [address] [place] [address] office. Pt. States she will try to complete applications and return with documentation to [address] [place] [address] office today.   AT that time an appointment will be scheduled with a PCP and neurologist.

## 2018-12-10 ENCOUNTER — HOSPITAL ENCOUNTER (EMERGENCY)
Age: 28
Discharge: HOME | End: 2018-12-10
Attending: EMERGENCY MEDICINE | Admitting: EMERGENCY MEDICINE
Payer: SUBSIDIZED

## 2018-12-10 VITALS
DIASTOLIC BLOOD PRESSURE: 95 MMHG | RESPIRATION RATE: 16 BRPM | OXYGEN SATURATION: 98 % | SYSTOLIC BLOOD PRESSURE: 153 MMHG | TEMPERATURE: 98 F | HEART RATE: 103 BPM

## 2018-12-10 DIAGNOSIS — L03.211 CELLULITIS OF FACE: Primary | ICD-10-CM

## 2018-12-10 LAB — HCG UR QL: NEGATIVE

## 2018-12-10 PROCEDURE — 74011250637 HC RX REV CODE- 250/637: Performed by: PHYSICIAN ASSISTANT

## 2018-12-10 PROCEDURE — 81025 URINE PREGNANCY TEST: CPT

## 2018-12-10 PROCEDURE — 99283 EMERGENCY DEPT VISIT LOW MDM: CPT

## 2018-12-10 RX ORDER — CEPHALEXIN 500 MG/1
500 CAPSULE ORAL 4 TIMES DAILY
Qty: 28 CAP | Refills: 0 | Status: SHIPPED | OUTPATIENT
Start: 2018-12-10 | End: 2018-12-17

## 2018-12-10 RX ORDER — CEPHALEXIN 250 MG/1
500 CAPSULE ORAL
Status: COMPLETED | OUTPATIENT
Start: 2018-12-10 | End: 2018-12-10

## 2018-12-10 RX ADMIN — CEPHALEXIN 500 MG: 250 CAPSULE ORAL at 14:11

## 2018-12-10 NOTE — ED PROVIDER NOTES
EMERGENCY DEPARTMENT HISTORY AND PHYSICAL EXAM 
 
12:46 PM 
 
 
Date: 12/10/2018 Patient Name: Cosmo Petersen History of Presenting Illness Chief Complaint Patient presents with  
 Other  
  nasal swelling and bump on face History Provided By: Patient Chief Complaint: Nasal pain Duration:  5 days Timing:  Worsening Location: Left of side of nose Quality: N/A Severity: Moderate Modifying Factors: The patient has tried applying antibiotic ointment and  on her nose without relief. Associated Symptoms: \"knot\" on the left side of her face. Denies any drainage. Denies any other associated signs or symptoms. Additional History (Context): Cosmo Petersen is a 33 y.o. female who presents with 5 days of worsening left nasal pain and swelling with an associated symptom of a \"knot\" on the left side of her face. She denies any drainage from her nose. Denies any other associated signs or symptoms. The patient has tried applying antibiotic ointment and  without relief. The patient does not have a PCP and is not currently on blood pressure medication. States her LMP 1-2 months ago. PCP: None Current Outpatient Medications Medication Sig Dispense Refill  cephALEXin (KEFLEX) 500 mg capsule Take 1 Cap by mouth four (4) times daily for 7 days. 28 Cap 0  
 butalbital-acetaminophen-caff (FIORICET) -40 mg per capsule Take 1 Cap by mouth every four (4) hours as needed for Headache. 15 Cap 0  clotrimazole (LOTRIMIN) 1 % topical cream Apply  to affected area two (2) times a day. Apply to affected area 1 Tube 0 Past History Past Medical History: 
Past Medical History:  
Diagnosis Date  Hypertension  UTI (lower urinary tract infection) Past Surgical History: No past surgical history on file. Family History: No family history on file. Social History: 
Social History Tobacco Use  Smoking status: Former Smoker Substance Use Topics  Alcohol use: Yes Comment: 2 wine coollers a week  Drug use: No  
 
 
Allergies: Allergies Allergen Reactions  Mushroom Shortness of Breath Review of Systems Review of Systems Constitutional: Negative for chills and fever. HENT:  
     Positive for nasal pain and swelling. Positive for \"knot\" on left side of face. Respiratory: Negative for shortness of breath. Cardiovascular: Negative for chest pain. Gastrointestinal: Negative for abdominal pain, nausea and vomiting. Skin: Negative for rash. Neurological: Negative for weakness. All other systems reviewed and are negative. Physical Exam  
 
Visit Vitals BP (!) 153/95 Pulse (!) 103 Temp 98 °F (36.7 °C) Resp 16 SpO2 98% Physical Exam  
Constitutional: She appears well-developed and well-nourished. No distress. HENT:  
Head: Normocephalic and atraumatic. Moderate edema and erythema to left nare. No fluctuance, no drainage, no streaking into face. One small mobile L preauricular lymph node Neck: Normal range of motion. Neck supple. Cardiovascular: Normal rate, regular rhythm, normal heart sounds and intact distal pulses. Exam reveals no gallop and no friction rub. No murmur heard. Pulmonary/Chest: Effort normal and breath sounds normal. No respiratory distress. She has no wheezes. She has no rales. Musculoskeletal: Normal range of motion. Neurological: She is alert. Skin: Skin is warm. No rash noted. She is not diaphoretic. Nursing note and vitals reviewed. Diagnostic Study Results Labs - Recent Results (from the past 12 hour(s)) HCG URINE, QL Collection Time: 12/10/18 12:47 PM  
Result Value Ref Range HCG urine, QL NEGATIVE  NEG Radiologic Studies - No orders to display Medical Decision Making I am the first provider for this patient.  
 
I reviewed the vital signs, available nursing notes, past medical history, past surgical history, family history and social history. Vital Signs-Reviewed the patient's vital signs. Pulse Oximetry Analysis -  98% on room air (Interpretation) WNL Records Reviewed: Nursing Notes and Old Medical Records (Time of Review: 12:46 PM) 
 
ED Course: Progress Notes, Reevaluation, and Consults: 
1:55 PM Discussed with patient need for close outpatient follow up. If unable to see PMD, discussed return to ED for wound check in 2 days. Discussed returning sooner for any new, worsening, or persistent symptoms. Provider Notes (Medical Decision Making):  Cellulitis of left nare. No abscess. No streaking. Afebrile, non-toxic appearing. Initial dose of antibiotics administered in ED. Stable for discharge with wound check in two days. Diagnosis Clinical Impression: 1. Cellulitis of face Disposition: home Follow-up Information Follow up With Specialties Details Why Contact Info [facility] EMERGENCY DEPT Emergency Medicine  If symptoms worsen [address] [facility] [address] 
[phone] Sheryl Chan [address]  Call for help with insurance and follow-up  [facility] [address] [facility] [address] [facility] [address] 
[phone] [address] [facility]  Schedule an appointment as soon as possible for a visit  [address] [facility] [address] [facility] [address] 
[phone] Medication List  
  
START taking these medications   
cephALEXin 500 mg capsule Commonly known as:  Jackqueline Moras Take 1 Cap by mouth four (4) times daily for 7 days. ASK your doctor about these medications   
butalbital-acetaminophen-caff -40 mg per capsule Commonly known as:  [employer] Take 1 Cap by mouth every four (4) hours as needed for Headache. clotrimazole 1 % topical cream 
Commonly known as:  Max Rodriguez Apply  to affected area two (2) times a day. Apply to affected area Where to Get Your Medications Information about where to get these medications is not yet available Ask your nurse or doctor about these medications · cephALEXin 500 mg capsule 
  
 
_______________________________ Scribe Attestation Heber Rosa acting as a scribe for and in the presence of Ambrosio, [address] December 10, 2018 at 2:51 PM 
    
Provider Attestation:     
I personally performed the services described in the documentation, reviewed the documentation, as recorded by the scribe in my presence, and it accurately and completely records my words and actions. December 10, 2018 at 2:51 PM - NANI Valente 
 
 
_______________________________

## 2018-12-10 NOTE — DISCHARGE INSTRUCTIONS
Take medication as prescribed. Follow-up with your primary care physician in 2 days for reassessment. Bring the results from this visit with your for their review. If you are unable to follow-up, please return to the ED for wound check in 2 days. Return to the ED for any new, worsening, or persistent symptoms, including fever, increased redness, swelling, or any other medical concerns. Apply warm compresses to the site. Cellulitis: Care Instructions  Your Care Instructions    Cellulitis is a skin infection caused by bacteria, most often strep or staph. It often occurs after a break in the skin from a scrape, cut, bite, or puncture, or after a rash. Cellulitis may be treated without doing tests to find out what caused it. But your doctor may do tests, if needed, to look for a specific bacteria, like methicillin-resistant Staphylococcus aureus (MRSA). The doctor has checked you carefully, but problems can develop later. If you notice any problems or new symptoms, get medical treatment right away. Follow-up care is a key part of your treatment and safety. Be sure to make and go to all appointments, and call your doctor if you are having problems. It's also a good idea to know your test results and keep a list of the medicines you take. How can you care for yourself at home? · Take your antibiotics as directed. Do not stop taking them just because you feel better. You need to take the full course of antibiotics. · Prop up the infected area on pillows to reduce pain and swelling. Try to keep the area above the level of your heart as often as you can. · If your doctor told you how to care for your wound, follow your doctor's instructions. If you did not get instructions, follow this general advice:  ? Wash the wound with clean water 2 times a day. Don't use hydrogen peroxide or alcohol, which can slow healing.   ? You may cover the wound with a thin layer of petroleum jelly, such as Vaseline, and a nonstick bandage. ? Apply more petroleum jelly and replace the bandage as needed. · Be safe with medicines. Take pain medicines exactly as directed. ? If the doctor gave you a prescription medicine for pain, take it as prescribed. ? If you are not taking a prescription pain medicine, ask your doctor if you can take an over-the-counter medicine. To prevent cellulitis in the future  · Try to prevent cuts, scrapes, or other injuries to your skin. Cellulitis most often occurs where there is a break in the skin. · If you get a scrape, cut, mild burn, or bite, wash the wound with clean water as soon as you can to help avoid infection. Don't use hydrogen peroxide or alcohol, which can slow healing. · If you have swelling in your legs (edema), support stockings and good skin care may help prevent leg sores and cellulitis. · Take care of your feet, especially if you have diabetes or other conditions that increase the risk of infection. Wear shoes and socks. Do not go barefoot. If you have athlete's foot or other skin problems on your feet, talk to your doctor about how to treat them. When should you call for help? Call your doctor now or seek immediate medical care if:    · You have signs that your infection is getting worse, such as:  ? Increased pain, swelling, warmth, or redness. ? Red streaks leading from the area. ? Pus draining from the area. ? A fever.     · You get a rash.    Watch closely for changes in your health, and be sure to contact your doctor if:    · You do not get better as expected. Where can you learn more? Go to http://leona-liss.info/. Jennifer Warren in the search box to learn more about \"Cellulitis: Care Instructions. \"  Current as of: April 18, 2018  Content Version: 11.8  © 6877-3106 [employer]. Care instructions adapted under license by [employer] (which disclaims liability or warranty for this information).  If you have questions about a medical condition or this instruction, always ask your healthcare professional. [place] [address] any warranty or liability for your use of this information.

## 2018-12-10 NOTE — ED TRIAGE NOTES
Patient arrived from home c/o nasal swelling and small bump on face. Patient states hx hypertension but has not followed up with primary care

## 2018-12-10 NOTE — LETTER
[address] [place] [address] Dr [facility] EMERGENCY DEPT 
[address] [facility] [address] 
[phone] Work/School Note Date: 12/10/2018 To Whom It May concern: 
 
Farhat Guevara was seen and treated today in the emergency room by the following provider(s): 
Attending Provider: Anmol Reyna MD 
Physician Assistant: Paramjit Jackson Farhat Guevara may return to work on 12/12/18. Sincerely, 
 
 
 
 
Edgar Francis

## 2023-03-22 NOTE — ED TRIAGE NOTES
[facility]  Rooming Progress Note      · Severity - Current is 4/10; best is 2/10; worst is 6/10.  · Sitting tolerance - 10-30 minutes  · Walking tolerance - 1-10 minutes  · Any new numbness or weakness in your limbs since your last visit with Dr. Montague - No  · Any new medical condition since your last visit with Dr. Montague - No  · Any significant change in your lifestyle since your last visit with Dr. Montague - No  · Any new treatment/x-rays/MRI since your last visit with Dr. Montague - No  · Any sleep disturbance - No  · Any aberrant behavior - No  · Any positive urine drug screen-  No  · Pill count performed - No; consistent    · Performing a home exercise and stretching regimen? No    REVIEW OF SYSTEMS:  Pertinent positive ROS are check marked ([x]).  All other ROS are negative.    Constitutional:  []  Chills  []  Fatigue  []  Fever  []  Insomnia  []  Weight Gain  []  Weight Loss    Musculoskeletal:  []  Joint Pain  []  Leg Pain  []  Arm Pain  []  Low Back Pain  []  Mid Back Pain  []  Neck Pain  []  Cold Feet  []  Varicose Veins   Neuro/Psychiatric:   []  Anxiety  []  Depression  []  Fainting  []  Headache  []  Clumsiness  []  Memory Loss  []  Seizures             The documentation recorded by the staff accurately and completely reflects the service(s) personally performed and the decisions made by Dr. Montague.     Patient complains of headache x 3-4 months but has worsened today. Patient's bp is elevated.